# Patient Record
Sex: FEMALE | Race: AMERICAN INDIAN OR ALASKA NATIVE
[De-identification: names, ages, dates, MRNs, and addresses within clinical notes are randomized per-mention and may not be internally consistent; named-entity substitution may affect disease eponyms.]

---

## 2020-01-16 ENCOUNTER — HOSPITAL ENCOUNTER (EMERGENCY)
Dept: HOSPITAL 5 - ED | Age: 35
LOS: 1 days | Discharge: HOME | End: 2020-01-17
Payer: COMMERCIAL

## 2020-01-16 DIAGNOSIS — R51: ICD-10-CM

## 2020-01-16 DIAGNOSIS — B96.89: ICD-10-CM

## 2020-01-16 DIAGNOSIS — N76.0: Primary | ICD-10-CM

## 2020-01-16 DIAGNOSIS — R07.89: ICD-10-CM

## 2020-01-16 LAB
ALBUMIN SERPL-MCNC: 4.2 G/DL (ref 3.9–5)
ALT SERPL-CCNC: 25 UNITS/L (ref 7–56)
APTT BLD: 23 SEC. (ref 24.2–36.6)
BACTERIA #/AREA URNS HPF: (no result) /HPF
BASOPHILS # (AUTO): 0 K/MM3 (ref 0–0.1)
BASOPHILS NFR BLD AUTO: 0.5 % (ref 0–1.8)
BILIRUB UR QL STRIP: (no result)
BLOOD UR QL VISUAL: (no result)
BUN SERPL-MCNC: 10 MG/DL (ref 7–17)
BUN/CREAT SERPL: 14 %
CALCIUM SERPL-MCNC: 9.5 MG/DL (ref 8.4–10.2)
EOSINOPHIL # BLD AUTO: 0.2 K/MM3 (ref 0–0.4)
EOSINOPHIL NFR BLD AUTO: 2.6 % (ref 0–4.3)
HCT VFR BLD CALC: 39.9 % (ref 30.3–42.9)
HEMOLYSIS INDEX: 19
HGB BLD-MCNC: 13.6 GM/DL (ref 10.1–14.3)
INR PPP: 1.11 (ref 0.87–1.13)
LYMPHOCYTES # BLD AUTO: 2.7 K/MM3 (ref 1.2–5.4)
LYMPHOCYTES NFR BLD AUTO: 42.3 % (ref 13.4–35)
MCHC RBC AUTO-ENTMCNC: 34 % (ref 30–34)
MCV RBC AUTO: 89 FL (ref 79–97)
MONOCYTES # (AUTO): 0.6 K/MM3 (ref 0–0.8)
MONOCYTES % (AUTO): 9.1 % (ref 0–7.3)
MUCOUS THREADS #/AREA URNS HPF: (no result) /HPF
PH UR STRIP: 7 [PH] (ref 5–7)
PLATELET # BLD: 278 K/MM3 (ref 140–440)
PROT UR STRIP-MCNC: (no result) MG/DL
RBC # BLD AUTO: 4.5 M/MM3 (ref 3.65–5.03)
RBC #/AREA URNS HPF: 3 /HPF (ref 0–6)
UROBILINOGEN UR-MCNC: < 2 MG/DL (ref ?–2)
WBC #/AREA URNS HPF: < 1 /HPF (ref 0–6)

## 2020-01-16 PROCEDURE — 81025 URINE PREGNANCY TEST: CPT

## 2020-01-16 PROCEDURE — 87210 SMEAR WET MOUNT SALINE/INK: CPT

## 2020-01-16 PROCEDURE — 99284 EMERGENCY DEPT VISIT MOD MDM: CPT

## 2020-01-16 PROCEDURE — 96374 THER/PROPH/DIAG INJ IV PUSH: CPT

## 2020-01-16 PROCEDURE — 85025 COMPLETE CBC W/AUTO DIFF WBC: CPT

## 2020-01-16 PROCEDURE — 36415 COLL VENOUS BLD VENIPUNCTURE: CPT

## 2020-01-16 PROCEDURE — 93005 ELECTROCARDIOGRAM TRACING: CPT

## 2020-01-16 PROCEDURE — 96372 THER/PROPH/DIAG INJ SC/IM: CPT

## 2020-01-16 PROCEDURE — 93010 ELECTROCARDIOGRAM REPORT: CPT

## 2020-01-16 PROCEDURE — 80053 COMPREHEN METABOLIC PANEL: CPT

## 2020-01-16 PROCEDURE — 81001 URINALYSIS AUTO W/SCOPE: CPT

## 2020-01-16 PROCEDURE — 85610 PROTHROMBIN TIME: CPT

## 2020-01-16 PROCEDURE — 85730 THROMBOPLASTIN TIME PARTIAL: CPT

## 2020-01-16 PROCEDURE — 96375 TX/PRO/DX INJ NEW DRUG ADDON: CPT

## 2020-01-16 PROCEDURE — 71046 X-RAY EXAM CHEST 2 VIEWS: CPT

## 2020-01-16 PROCEDURE — 87591 N.GONORRHOEAE DNA AMP PROB: CPT

## 2020-01-16 PROCEDURE — 84484 ASSAY OF TROPONIN QUANT: CPT

## 2020-01-16 NOTE — EMERGENCY DEPARTMENT REPORT
Blank Doc





- Documentation


Documentation: 





34-year-old female that presents with left sided pelvic pain.  Denies any n/v.





This initial assessment/diagnostic orders/clinical plan/treatment(s) is/are 

subject to change based on patient's health status, clinical progression and re-

assessment by fellow clinical providers in the ED.  Further treatment and workup

at subsequent clinical providers discretion.  Patient/guardians urged not to 

elope from the ED as their condition may be serious if not clinically assessed 

and managed.  Initial orders include:


1- Patient sent to [facility] for further evaluation and treatment


2- UA

## 2020-01-17 VITALS — SYSTOLIC BLOOD PRESSURE: 104 MMHG | DIASTOLIC BLOOD PRESSURE: 65 MMHG

## 2020-01-17 NOTE — EMERGENCY DEPARTMENT REPORT
ED General Adult HPI





- General


Chief complaint: Abdominal Pain


Stated complaint: LOWER ABD/LFT SHOULDER PAIN


Time Seen by Provider: 20 19:18


Source: patient


Mode of arrival: Ambulatory


Limitations: No Limitations





- History of Present Illness


Initial comments: 





Patient is a 34-year-old female presents emergency room with multiple 

complaints.  She states she has lower abdominal discomfort that began today.  

She states she has left-sided chest pain, left shoulder pain, migraine.  She 

states that she had nausea and one episode of vomiting.  She states that she has

vaginal discharge.  She denies any fever, diarrhea, shortness of breath, leg 

swelling, recent surgery, recent immobilization, recent travel, hormone use.  

She states she has a past medical history of sickle cell and has never been 

transfused.  She states she takes folic acid.  She denies any allergies 

medications.  She states her last menstrual cycle was 19.


Severity scale (0 -10): 7





- Related Data


                                  Previous Rx's











 Medication  Instructions  Recorded  Last Taken  Type


 


metroNIDAZOLE [Flagyl] 500 mg PO BID 7 Days #14 tab 20 Unknown Rx











                                    Allergies











Allergy/AdvReac Type Severity Reaction Status Date / Time


 


No Known Allergies Allergy   Unverified 12/19/15 22:11














ED Review of Systems


ROS: 


Stated complaint: LOWER ABD/LFT SHOULDER PAIN


Other details as noted in HPI





Comment: All other systems reviewed and negative





ED Past Medical Hx





- Past Medical History


Hx Hypertension: No


Hx Diabetes: No


Hx Deep Vein Thrombosis: No


Hx Renal Disease: No


Hx Sickle Cell Disease: No


Hx Seizures: No


Hx Asthma: No





- Surgical History


Additional Surgical History: 





- Social History


Smoking Status: Never Smoker


Substance Use Type: None





- Medications


Home Medications: 


                                Home Medications











 Medication  Instructions  Recorded  Confirmed  Last Taken  Type


 


metroNIDAZOLE [Flagyl] 500 mg PO BID 7 Days #14 tab 20  Unknown Rx














ED Physical Exam





- General


Limitations: No Limitations


General appearance: alert, in no apparent distress





- Head


Head exam: Present: atraumatic, normocephalic





- Eye


Eye exam: Present: normal appearance





- ENT


ENT exam: Present: mucous membranes moist





- Respiratory


Respiratory exam: Present: normal lung sounds bilaterally.  Absent: respiratory 

distress, wheezes, rales, rhonchi, stridor, chest wall tenderness, accessory 

muscle use, decreased breath sounds, prolonged expiratory





- Cardiovascular


Cardiovascular Exam: Present: regular rate, normal rhythm, normal heart sounds. 

 Absent: systolic murmur, diastolic murmur, rubs, gallop





- GI/Abdominal


GI/Abdominal exam: Present: soft, normal bowel sounds.  Absent: distended, 

tenderness, guarding, rebound, rigid





- 


External exam: Present: normal external exam.  Absent: erythema, swelling, 

lesions, lacerations, ecchymosis, bleeding


Speculum exam: Present: vaginal discharge (white), cervical discharge (white), 

other (chaperone: cristofer, EMT).  Absent: erythema, vaginal bleeding, foreign 

body, tissue, laceration


Bi-manual exam: Present: normal bi-manual exam.  Absent: cervical motion 

tendernes, adnexal tenderness, adnexal mass





- Extremities Exam


Extremities exam: Present: other (able to lift both arms above the head without 

difficulty, FROM of the BUE, neurovasularly intact, no bony ttp, no deformity, 

no joint laxity).  Absent: pedal edema





- Neurological Exam


Neurological exam: Present: alert, oriented X3





- Psychiatric


Psychiatric exam: Present: normal affect, normal mood





- Skin


Skin exam: Present: warm, dry, intact





ED Course


                                   Vital Signs











  20





  18:28 19:20 02:57


 


Temperature 98.4 F 98.4 F 97.9 F


 


Pulse Rate 85 87 86


 


Respiratory 14 18 18





Rate   


 


Blood Pressure 118/59 118/59 


 


Blood Pressure   104/65





[Right]   


 


O2 Sat by Pulse 98 100 98





Oximetry   














ED Medical Decision Making





- Lab Data


Result diagrams: 


                                 20 22:46





                                 20 22:46








                                   Lab Results











  20 Range/Units





  20:08 22:46 22:46 


 


WBC   6.4   (4.5-11.0)  K/mm3


 


RBC   4.50   (3.65-5.03)  M/mm3


 


Hgb   13.6   (10.1-14.3)  gm/dl


 


Hct   39.9   (30.3-42.9)  %


 


MCV   89   (79-97)  fl


 


MCH   30   (28-32)  pg


 


MCHC   34   (30-34)  %


 


RDW   13.5   (13.2-15.2)  %


 


Plt Count   278   (140-440)  K/mm3


 


Lymph % (Auto)   42.3 H   (13.4-35.0)  %


 


Mono % (Auto)   9.1 H   (0.0-7.3)  %


 


Eos % (Auto)   2.6   (0.0-4.3)  %


 


Baso % (Auto)   0.5   (0.0-1.8)  %


 


Lymph #   2.7   (1.2-5.4)  K/mm3


 


Mono #   0.6   (0.0-0.8)  K/mm3


 


Eos #   0.2   (0.0-0.4)  K/mm3


 


Baso #   0.0   (0.0-0.1)  K/mm3


 


Seg Neutrophils %   45.5   (40.0-70.0)  %


 


Seg Neutrophils #   2.9   (1.8-7.7)  K/mm3


 


PT    14.5  (12.2-14.9)  Sec.


 


INR    1.11  (0.87-1.13)  


 


APTT    23.0 L  (24.2-36.6)  Sec.


 


Sodium     (137-145)  mmol/L


 


Potassium     (3.6-5.0)  mmol/L


 


Chloride     ()  mmol/L


 


Carbon Dioxide     (22-30)  mmol/L


 


Anion Gap     mmol/L


 


BUN     (7-17)  mg/dL


 


Creatinine     (0.7-1.2)  mg/dL


 


Estimated GFR     ml/min


 


BUN/Creatinine Ratio     %


 


Glucose     ()  mg/dL


 


Calcium     (8.4-10.2)  mg/dL


 


Total Bilirubin     (0.1-1.2)  mg/dL


 


AST     (5-40)  units/L


 


ALT     (7-56)  units/L


 


Alkaline Phosphatase     ()  units/L


 


Troponin T     (0.00-0.029)  ng/mL


 


Total Protein     (6.3-8.2)  g/dL


 


Albumin     (3.9-5)  g/dL


 


Albumin/Globulin Ratio     %


 


Urine Color  Yellow    (Yellow)  


 


Urine Turbidity  Clear    (Clear)  


 


Urine pH  7.0    (5.0-7.0)  


 


Ur Specific Gravity  1.012    (1.003-1.030)  


 


Urine Protein  <15 mg/dl    (Negative)  mg/dL


 


Urine Glucose (UA)  Neg    (Negative)  mg/dL


 


Urine Ketones  Neg    (Negative)  mg/dL


 


Urine Blood  Neg    (Negative)  


 


Urine Nitrite  Neg    (Negative)  


 


Ur Reducing Substances  Not Reportable    


 


Urine Bilirubin  Neg    (Negative)  


 


Urine Ictotest  Not Reportable    


 


Urine Urobilinogen  < 2.0    (<2.0)  mg/dL


 


Ur Leukocyte Esterase  Neg    (Negative)  


 


Urine WBC (Auto)  < 1.0    (0.0-6.0)  /HPF


 


Urine RBC (Auto)  3.0    (0.0-6.0)  /HPF


 


U Epithel Cells (Auto)  4.0    (0-13.0)  /HPF


 


Urine Bacteria (Auto)  2+    (Negative)  /HPF


 


Urine Mucus  Few    /HPF


 


Urine HCG, Qual  Negative    (Negative)  














  20 Range/Units





  22:46 01:39 


 


WBC    (4.5-11.0)  K/mm3


 


RBC    (3.65-5.03)  M/mm3


 


Hgb    (10.1-14.3)  gm/dl


 


Hct    (30.3-42.9)  %


 


MCV    (79-97)  fl


 


MCH    (28-32)  pg


 


MCHC    (30-34)  %


 


RDW    (13.2-15.2)  %


 


Plt Count    (140-440)  K/mm3


 


Lymph % (Auto)    (13.4-35.0)  %


 


Mono % (Auto)    (0.0-7.3)  %


 


Eos % (Auto)    (0.0-4.3)  %


 


Baso % (Auto)    (0.0-1.8)  %


 


Lymph #    (1.2-5.4)  K/mm3


 


Mono #    (0.0-0.8)  K/mm3


 


Eos #    (0.0-0.4)  K/mm3


 


Baso #    (0.0-0.1)  K/mm3


 


Seg Neutrophils %    (40.0-70.0)  %


 


Seg Neutrophils #    (1.8-7.7)  K/mm3


 


PT    (12.2-14.9)  Sec.


 


INR    (0.87-1.13)  


 


APTT    (24.2-36.6)  Sec.


 


Sodium  141   (137-145)  mmol/L


 


Potassium  4.3   (3.6-5.0)  mmol/L


 


Chloride  105.5   ()  mmol/L


 


Carbon Dioxide  24   (22-30)  mmol/L


 


Anion Gap  16   mmol/L


 


BUN  10   (7-17)  mg/dL


 


Creatinine  0.7   (0.7-1.2)  mg/dL


 


Estimated GFR  > 60   ml/min


 


BUN/Creatinine Ratio  14   %


 


Glucose  87   ()  mg/dL


 


Calcium  9.5   (8.4-10.2)  mg/dL


 


Total Bilirubin  < 0.20   (0.1-1.2)  mg/dL


 


AST  23   (5-40)  units/L


 


ALT  25   (7-56)  units/L


 


Alkaline Phosphatase  49   ()  units/L


 


Troponin T  < 0.010  < 0.010  (0.00-0.029)  ng/mL


 


Total Protein  7.1   (6.3-8.2)  g/dL


 


Albumin  4.2   (3.9-5)  g/dL


 


Albumin/Globulin Ratio  1.4   %


 


Urine Color    (Yellow)  


 


Urine Turbidity    (Clear)  


 


Urine pH    (5.0-7.0)  


 


Ur Specific Gravity    (1.003-1.030)  


 


Urine Protein    (Negative)  mg/dL


 


Urine Glucose (UA)    (Negative)  mg/dL


 


Urine Ketones    (Negative)  mg/dL


 


Urine Blood    (Negative)  


 


Urine Nitrite    (Negative)  


 


Ur Reducing Substances    


 


Urine Bilirubin    (Negative)  


 


Urine Ictotest    


 


Urine Urobilinogen    (<2.0)  mg/dL


 


Ur Leukocyte Esterase    (Negative)  


 


Urine WBC (Auto)    (0.0-6.0)  /HPF


 


Urine RBC (Auto)    (0.0-6.0)  /HPF


 


U Epithel Cells (Auto)    (0-13.0)  /HPF


 


Urine Bacteria (Auto)    (Negative)  /HPF


 


Urine Mucus    /HPF


 


Urine HCG, Qual    (Negative)  














- EKG Data


EKG shows normal: sinus rhythm, axis, intervals, QRS complexes, ST-T waves


Rate: normal





- Radiology Data


Radiology results: report reviewed





CHEST 2 VIEWS





INDICATION / CLINICAL INFORMATION:


Chest pain.





COMPARISON:


None available.





FINDINGS:





SUPPORT DEVICES: None.


HEART / MEDIASTINUM: The heart size and pulmonary vasculature are normal. The 

aorta is normal in


caliber.


LUNGS / PLEURA: No significant pulmonary or pleural abnormality. No 

pneumothorax.


ADDITIONAL FINDINGS: No significant additional findings.





IMPRESSION: No acute findings.





Signer Name: Kaiden Botello MD


Signed: 2020 11:58 PM


Workstation Name: [id number]








Transcribed By: RT


Dictated By: Kaiden Botello MD


Electronically Authenticated By: Kaiden Botello MD


Signed Date/Time: 201











DD/DT: 20


TD/TT:





- Medical Decision Making





Patient is a 34-year-old female presents emergency room with multiple 

complaints.  She states she has lower abdominal discomfort that began today.  

She states she has left-sided chest pain, left shoulder pain, migraine.  She 

states that she had nausea and one episode of vomiting.  She states that she has

 vaginal discharge.  She denies any fever, diarrhea, shortness of breath, leg 

swelling, recent surgery, recent immobilization, recent travel, hormone use.  

She states she has a past medical history of sickle cell and has never been 

transfused.  She states she takes folic acid.  She denies any allergies 

medications.  She states her last menstrual cycle was 19.  Vitals are 

normal.  Labs are stable.  Troponin is negative 2.  UA without evidence of UTI.

  Urine pregnancy is negative.  Chest x-ray with no acute process.  EKG is 

within normal limits.  RICO and heart score 0, very low risk for cardiac event. 

 PERC criteria negative for PE.  Patient given Toradol, Reglan, Benadryl, normal

 saline and symptoms improved and she was feeling better.  Wet prep shows 

evidence of bacterial vaginosis.  G/C swab sent.  Patient treated 

prophylactically for G/C with azithromycin and ceftriaxone.  Patient given 

prescription for Flagyl for BV. advised pt to please take medication as 

prescribed.  Do not drink alcohol while taking medication.  Please go to medical

 records with your 's license and one week for results of your tests but 

you have been treated for these today.  Please have your partner tested and 

treated as well.  Please do not engage in sexual intercourse for 2 weeks.  

Please follow-up with a primary care doctor and cardiologist in the next 2-3 

days.  Return to the emergency room for any new or worsening symptoms.


Critical care attestation.: 


If time is entered above; I have spent that time in minutes in the direct care 

of this critically ill patient, excluding procedure time.








ED Disposition


Clinical Impression: 


 Lower abdominal pain, Bacterial vaginosis





Chest pain


Qualifiers:


 Chest pain type: unspecified Qualified Code(s): R07.9 - Chest pain, unspecified





Headache


Qualifiers:


 Headache type: unspecified Headache chronicity pattern: acute headache 

Intractability: not intractable Qualified Code(s): R51 - Headache





Disposition: DC-01 TO HOME OR SELFCARE


Is pt being admited?: No


Does the pt Need Aspirin: No


Condition: Stable


Instructions:  Chest Pain (ED), Bacterial Vaginosis (ED), Abdominal Pain (ED)


Additional Instructions: 


Please take medication as prescribed.  Do not drink alcohol while taking 

medication.  Please go to medical records with your 's license and one 

week for results of your tests but you have been treated for these today.  

Please have your partner tested and treated as well.  Please do not engage in 

sexual intercourse for 2 weeks.  Please follow-up with a primary care doctor and

 cardiologist in the next 2-3 days.  Return to the emergency room for any new or

 worsening symptoms.


Prescriptions: 


metroNIDAZOLE [Flagyl] 500 mg PO BID 7 Days #14 tab


Referrals: 


NAYELY MCKEON MD [Staff Physician] - 2-3 Days


DOREEN PRASAD MD [Staff Physician] - 2-3 Days


[facility] [Outside] - 2-3 Days


[facility] [Outside] - 2-3 Days


Forms:  STI Treatment and Prevention


Time of Disposition: 02:37


Print Language: ENGLISH

## 2020-01-17 NOTE — XRAY REPORT
CHEST 2 VIEWS



INDICATION / CLINICAL INFORMATION:

Chest pain.



COMPARISON: 

None available.



FINDINGS:



SUPPORT DEVICES: None.

HEART / MEDIASTINUM: The heart size and pulmonary vasculature are normal. The aorta is normal in manjit
garo.

LUNGS / PLEURA: No significant pulmonary or pleural abnormality. No pneumothorax. 

ADDITIONAL FINDINGS: No significant additional findings.



IMPRESSION: No acute findings.



Signer Name: Kaiden Botello MD 

Signed: 1/16/2020 11:58 PM

 Workstation Name: [id number]

## 2021-10-31 ENCOUNTER — HOSPITAL ENCOUNTER (OUTPATIENT)
Dept: HOSPITAL 5 - TRG | Age: 36
Discharge: HOME | End: 2021-10-31
Attending: OBSTETRICS & GYNECOLOGY
Payer: MEDICAID

## 2021-10-31 VITALS — DIASTOLIC BLOOD PRESSURE: 63 MMHG | SYSTOLIC BLOOD PRESSURE: 109 MMHG

## 2021-10-31 DIAGNOSIS — R10.2: ICD-10-CM

## 2021-10-31 DIAGNOSIS — Z3A.36: ICD-10-CM

## 2021-10-31 DIAGNOSIS — R10.9: ICD-10-CM

## 2021-10-31 DIAGNOSIS — O26.893: Primary | ICD-10-CM

## 2021-10-31 DIAGNOSIS — R51.9: ICD-10-CM

## 2021-10-31 PROCEDURE — 59025 FETAL NON-STRESS TEST: CPT

## 2021-10-31 PROCEDURE — 84112 EVAL AMNIOTIC FLUID PROTEIN: CPT

## 2021-10-31 PROCEDURE — 36415 COLL VENOUS BLD VENIPUNCTURE: CPT

## 2022-02-11 ENCOUNTER — HOSPITAL ENCOUNTER (OUTPATIENT)
Dept: HOSPITAL 5 - GIO | Age: 37
Discharge: HOME | End: 2022-02-11
Attending: INTERNAL MEDICINE
Payer: MEDICAID

## 2022-02-11 VITALS — DIASTOLIC BLOOD PRESSURE: 85 MMHG | SYSTOLIC BLOOD PRESSURE: 121 MMHG

## 2022-02-11 DIAGNOSIS — G47.30: ICD-10-CM

## 2022-02-11 DIAGNOSIS — Z80.3: ICD-10-CM

## 2022-02-11 DIAGNOSIS — R10.84: ICD-10-CM

## 2022-02-11 DIAGNOSIS — K51.90: ICD-10-CM

## 2022-02-11 DIAGNOSIS — Z79.899: ICD-10-CM

## 2022-02-11 DIAGNOSIS — Z20.822: ICD-10-CM

## 2022-02-11 DIAGNOSIS — K64.8: ICD-10-CM

## 2022-02-11 DIAGNOSIS — K92.2: Primary | ICD-10-CM

## 2022-02-11 PROCEDURE — 46221 LIGATION OF HEMORRHOID(S): CPT

## 2022-02-11 PROCEDURE — 45378 DIAGNOSTIC COLONOSCOPY: CPT

## 2022-02-11 PROCEDURE — U0003 INFECTIOUS AGENT DETECTION BY NUCLEIC ACID (DNA OR RNA); SEVERE ACUTE RESPIRATORY SYNDROME CORONAVIRUS 2 (SARS-COV-2) (CORONAVIRUS DISEASE [COVID-19]), AMPLIFIED PROBE TECHNIQUE, MAKING USE OF HIGH THROUGHPUT TECHNOLOGIES AS DESCRIBED BY CMS-2020-01-R: HCPCS

## 2022-02-11 NOTE — PROCEDURE NOTE
Date of procedure: 02/11/22


Pre-op diagnosis: Hematochezia


Post-op diagnosis: other (Hematochezia secondary to Moderate Internal 

Hemorrhoids x 3/ No colon Polyps or Diverticular disease noted)


Procedure: 





Colonoscopy/ Flex Sigmoidoscopy with banding x 3


Anesthesia: MAC


Surgeon: ALAN العراقي


Estimated blood loss: minimal


Pathology: none


Condition: stable


Disposition: same day (Treat with Tramadol, Sitz Bath; avoid aspirin and NSAID 

for 5 days, otherwise resume home medication and F/U in 1 to 2 weeks 

([phone]).)

## 2022-02-11 NOTE — ANESTHESIA CONSULTATION
Anesthesia Consult and Med Hx


Date of service: 02/11/22





- Airway


Anesthetic Teeth Evaluation: Good


ROM Head & Neck: Adequate


Mental/Hyoid Distance: Adequate


Mallampati Class: Class III


Intubation Access Assessment: Probably Good





- Pre-Operative Health Status


ASA Pre-Surgery Classification: ASA3


Proposed Anesthetic Plan: MAC





- Pulmonary


Hx Smoking: No


Hx Sleep Apnea: Yes





- Gastrointestinal


Hx Gastroesophageal Reflux Disease: No





- Hematic


Hx Sickle Cell Disease: Yes (Trait)





- Other Systems


Hx Obesity: No

## 2022-02-11 NOTE — OPERATIVE REPORT
DATE OF SURGERY: 2022



PROCEDURE:  Flexible sigmoidoscopy with banding.



INDICATIONS:  A 36-year-old -American female who has had a .  

She had been having hematochezia.  Colonoscopy showed normal colon mucosa.  No 

colon polyps or diverticula was noted.  Flexible sigmoidoscopy and banding was 

done to band the hemorrhoids, which is a possible cause of the patient's 

hematochezia.



DESCRIPTION OF PROCEDURE:  Procedure was done after getting informed consent 

with MAC anesthesia.  The EGD scope with the banding apparatus loaded was 

introduced and was retroflexed.  Three of the largest hemorrhoids were then 

suctioned into the suction channel and banded.  A total of four bands were 

deployed, but one did not stay and ultimately three bands were deployed.



ASSESSMENT:  Hematochezia secondary to internal hemorrhoids, status post banding

x 3.  At the end of the procedure, lidocaine gel was inserted into the rectal 

vault.



PLAN:  To treat the patient with analgesics, Sitz bath, have the patient avoid 

aspirin and aspirin-related products and have the patient follow up in the 

office in 1-2 weeks' time.  Procedure was done in the GI lab with GI lab team, 

which included the GI nurse, the GI tech and with assistance of Anesthesia.







DD: 2022 11:49 AM

DT: 2022 12:01 PM

TID: [id number] RECEIPT: [id number]

CAITLIN

## 2022-02-11 NOTE — OPERATIVE REPORT
DATE OF SURGERY: 02/11/2022



PROCEDURE:  Colonoscopy.



INDICATIONS:  A 36-year-old -American female with a strong family history

of cancer.  The patient's mother and grandmother have had breast cancer.  She 

recently had been noticing some hematochezia.  Colonoscopy was done to assess 

for the problem.



DESCRIPTION OF PROCEDURE:  Procedure was done after getting informed consent 

with MAC anesthesia.  Initial rectal examination was unremarkable.  The 

instrument was passed through the rectum onto the cecum, which showed normal 

mucosa.  Visualization was fair to good.  Cecum, ascending colon, transverse 

colon, descending colon, sigmoid showed normal mucosa.  There was no evidence of

any polyps, colitis or diverticular disease.  Rectum showed moderate internal 

hemorrhoid, which may have been the cause of the patient's hematochezia.



ASSESSMENT:  Hematochezia secondary to moderate internal hemorrhoids.  Family 

history of cancer, the patient's mother and grandmother had breast cancer.  

Normal colon mucosa.  No colon polyps, diverticular disease or colitis noted.



PLAN:  To do a flexible sigmoidoscopy with banding.  Procedure was done in the 

GI lab with assistance of the GI lab team, which included the GI nurse, the GI 

tech and with assistance of Anesthesia.







DD: 02/11/2022 11:48 AM

DT: 02/11/2022 11:57 AM

TID: [id number] RECEIPT: [id number]

CAITLIN

## 2022-03-10 ENCOUNTER — OFFICE VISIT (OUTPATIENT)
Dept: URBAN - METROPOLITAN AREA CLINIC 109 | Facility: CLINIC | Age: 37
End: 2022-03-10